# Patient Record
(demographics unavailable — no encounter records)

---

## 2024-12-27 NOTE — PHYSICAL EXAM
[General Appearance - Well Developed] : well developed [Normal Appearance] : normal appearance [Well Groomed] : well groomed [General Appearance - Well Nourished] : well nourished [No Deformities] : no deformities [General Appearance - In No Acute Distress] : no acute distress [Normal Conjunctiva] : the conjunctiva exhibited no abnormalities [Eyelids - No Xanthelasma] : the eyelids demonstrated no xanthelasmas [Normal Oral Mucosa] : normal oral mucosa [No Oral Pallor] : no oral pallor [No Oral Cyanosis] : no oral cyanosis [Normal Jugular Venous A Waves Present] : normal jugular venous A waves present [Normal Jugular Venous V Waves Present] : normal jugular venous V waves present [No Jugular Venous Myers A Waves] : no jugular venous myers A waves [Respiration, Rhythm And Depth] : normal respiratory rhythm and effort [Exaggerated Use Of Accessory Muscles For Inspiration] : no accessory muscle use [Auscultation Breath Sounds / Voice Sounds] : lungs were clear to auscultation bilaterally [Abdomen Soft] : soft [Abdomen Tenderness] : non-tender [Abdomen Mass (___ Cm)] : no abdominal mass palpated [Abnormal Walk] : normal gait [Gait - Sufficient For Exercise Testing] : the gait was sufficient for exercise testing [Nail Clubbing] : no clubbing of the fingernails [Cyanosis, Localized] : no localized cyanosis [Petechial Hemorrhages (___cm)] : no petechial hemorrhages [] : no rash [Skin Color & Pigmentation] : normal skin color and pigmentation [No Venous Stasis] : no venous stasis [Skin Lesions] : no skin lesions [No Skin Ulcers] : no skin ulcer [No Xanthoma] : no  xanthoma was observed [Oriented To Time, Place, And Person] : oriented to person, place, and time [Affect] : the affect was normal [Mood] : the mood was normal [No Anxiety] : not feeling anxious [Normal Rate] : normal [No Gallop] : no gallop heard [III] : a grade 3 [2+] : left 2+ [___+] : [unfilled]U+ pretibial pitting edema on the left [Well Developed] : well developed [Frail] : frail [Normal Venous Pressure] : normal venous pressure [Rhythm Regular] : regular [Normal S1, S2] : normal S1, S2 [Normal S1] : normal S1 [Normal S2] : normal S2 [No Murmur] : no murmurs heard [II] : a grade 2 [No Pitting Edema] : no pitting edema present [No Abnormalities] : the abdominal aorta was not enlarged and no bruit was heard [Clear Lung Fields] : clear lung fields [Soft] : abdomen soft [No Edema] : no edema [S3] : no S3 [S4] : no S4 [Right Femoral Bruit] : no bruit heard over the right femoral artery [Left Femoral Bruit] : no bruit heard over the left femoral artery [Right Carotid Bruit] : no bruit heard over the right carotid [Left Carotid Bruit] : no bruit heard over the left carotid [de-identified] : walker

## 2024-12-27 NOTE — REVIEW OF SYSTEMS
[Negative] : Heme/Lymph [Feeling Fatigued] : feeling fatigued [SOB] : no shortness of breath [Dyspnea on exertion] : dyspnea during exertion [Chest Discomfort] : no chest discomfort [Lower Ext Edema] : lower extremity edema [Leg Claudication] : no intermittent leg claudication [Palpitations] : no palpitations [PND] : PND [Orthopnea] : no orthopnea [Syncope] : no syncope [Blood in Stool] : no blood in stool [FreeTextEntry2] : see Hpi [FreeTextEntry5] : improved  [FreeTextEntry7] : denies bleeding

## 2024-12-27 NOTE — REVIEW OF SYSTEMS
[Negative] : Heme/Lymph [Feeling Fatigued] : feeling fatigued [SOB] : no shortness of breath [Dyspnea on exertion] : dyspnea during exertion [Chest Discomfort] : no chest discomfort [Lower Ext Edema] : lower extremity edema [Leg Claudication] : no intermittent leg claudication [Palpitations] : no palpitations [Orthopnea] : no orthopnea [PND] : PND [Syncope] : no syncope [Blood in Stool] : no blood in stool [FreeTextEntry2] : see Hpi [FreeTextEntry5] : improved  [FreeTextEntry7] : denies bleeding

## 2024-12-27 NOTE — HISTORY OF PRESENT ILLNESS
[FreeTextEntry1] : Julia presented to the office for a cardiovascular evaluation.  She was last seen in the office 3 months ago by Dr Farfan and arrives today for post hospitalization follow up.  She is now 83 years old, with a history of aortic stenosis.  She does not have a history of hypertension. She has had mild dyslipidemia with a high HDL. She has iron deficiency anemia and is on Venofer. She has chronic dyspnea based on obesity and a degree of COPD.  She presented to the hospital in October, 2019 with severe shortness of breath, with minimal activity. There was some initial concern about pulmonary embolism, but her CTA was negative. Ultimately, she was diagnosed with asthma and COPD, and was treated for a pulmonary process, and improved. There was some initial concern about the possibility of an ischemic process, but she improved without any ischemic issues being addressed.   Unfortunately, she developed unstable angina on Father's Day Weekend, 2022.  Cardiac catheterization revealed a 90% stenosis of the distal left main, a 90% stenosis of the ostial circumflex, and 90% stenosis of the ostium of the LAD.  A balloon pump was placed, as was a PA catheter, and she was transferred.  Her ejection fraction was noted to be 35%, with what was felt to be moderate aortic stenosis.  Her balloon pump was removed, complicated by a pseudoaneurysm.  She required thrombin injection.  Her course was further notable for paroxysmal atrial fibrillation, which converted to sinus rhythm, and for which she was treated with amiodarone and digoxin.  On July 1, 2022 she went for balloon aortic valvuloplasty.  She had rotational atherectomy and stenting of the left main, circumflex and LAD.  She was readmitted about 6 weeks later for elective transcatheter aortic valve replacement.  That procedure was done without complication.  Her post AVR ejection fraction was noted to be 65%.   She was admitted to the hospital in April 2023 with rectal bleeding.  Her Plavix was held.  No active cardiovascular issues were noted at that time.  I did not resume her clopidogrel, as I felt that the risk exceeded the benefit. She had colonoscopy with bandin.   She remained off clopidogrel, and on aspirin. She was scheduled for biopsy on 8/25/23 with Dr Martinez at [facility]. she was evaluated August 17, 2023 in anticipation of that procedure.  Her blood pressure was a bit low, and she was asked to hydrate.  She was otherwise considered optimized for her urgent procedure.  She was evaluated in the office on September 8, 2023 after having had a difficult course of hospitalizations.  She had sigmoidoscopy and biopsy, and the next day had rectal bleeding and was admitted for 2 units of blood.  She was discharged and then readmitted for another set of transfusions.  She needed to stay hospitalized for several days, and was ultimately discharged with a diagnosis of chronic inflammatory issues in her colon for which steroid enemas were recommended.  Unfortunately she has continued to have bleeding, though her blood counts have been stable.  It has not been exactly clear what her problems are, but it seems as if she has a smoldering level of inflammation contributing to ongoing mild rectal bleeding, but no meaningful hemorrhage has been ongoing.  She had been quite edematous, which seems to stem from fluid resuscitation in the hospital.  I asked her to resume furosemide and potassium, daily at that point, and to return to the office in 2 weeks.  When she was seen in the office in June, 2024 she reported chest discomfort.  I recommended pharmacologic nuclear stress testing.  Pharmacologic nuclear stress testing was performed August 2, 2024.  This revealed a medium size, mild defect in the anterior wall, suggestive of ischemia, with an ejection fraction of 90%.  By phone we discussed the abnormal result, and the possibility that perhaps additional investigation should be undertaken.  She reported that that discomfort had resolved, and we elected to be conservative.   Previously, she was taking furosemide on a daily basis for about a week, and had a significant improvement, with a weight loss of about 12 pounds.  She denies any recent issues with rectal bleeding, and she remains on Rinvoq, which seems to be helping.  Her hemoglobin has been reasonable, by report. She has been seeing Dr. Espinal, and has had her iron monitored regularly. She has not had an infusion recently.  She has intermittent worsening in edema, responsive to furosemide. She last took furosemide a few weeks ago.  She no longer experiences the shoulder discomfort for which we did the stress test several months ago.   She arrives today for post hospitalization follow up.  She was admitted into [facility] from 12/17-12/20 with Acute decompensated HF and possible COPD exacerbation. She reported couple of weeks of worsening dyspnea on exertion and increasing cough. No reported blood in her stool.  Took a Lasix pill a few days ago but did not change her symptoms.  Does endorse having leg swelling that is worse on the left. Hospital work up included LE doppler to assess LFT LE swelling > than the RT that was negative for DVT.  High sensitivity troponins were negative, Pro BNP was 1915, TSH of 4.27. She was diuresed with IV lasix 40mg BID and transitioned to oral.  B/P measurements were running on the lower side of normal, ramipril was discontinued.  she arrives today accompanied by her family. She is feeling better but still very fatigued which she had reported prior to her hospitalization.  Her DTR feels her fatigue is worse.  she sleeps alot during the day as per DTR. She is awake during the late night watching TV.   She sleeps in a recliner. Exertional effort is the same. Denies chest pains or dizziness.

## 2024-12-27 NOTE — DISCUSSION/SUMMARY
[FreeTextEntry1] : Her prior history as noted above includes an NSTEMI, transcatheter aortic valve replacement in 2022. She arrives today for post hospitalization follow up.  She was admitted with ADHF and COPD exacerbation. She arrives today in no acute distress. She appears compensated from a volume perspective despite some LE edema, which may be mostly dependent edema.   Her B/P is normal. ECG illustrates atrial fibrillation.  Her updated echocardiogram demonstrates from October 2024, notes LVEF 72%, grade II diastolic dysfunction. severe liyah annulus calcifications with mild-mod MR and mod TR. Normal functioning TAVR, PASP of 55. Pharmacologic nuclear stress testing was performed August 2, 2024.  This revealed a medium size, mild defect in the anterior wall, suggestive of ischemia, with an ejection fraction of 90%.  For now, she will continue lasix 40mg daily for volume management in the setting of HFpEF.  She will remain off ramipril for now.  Will try to slowly optimize GDMT as B/P tolerates.  She has been back in atrial fibrillation noted today as well as during her admission despite AAD therapy with amiodarone 100mg. With history of recurrent GI bleed( AVMs) and anemia, oral A/C therapy will likely make her bleeding risk out weight stroke risk.  At this time, would need to address the benefits of amiodarone therapy given she is going in and out of AFib while off A/C. She will continue metoprolol 100mg daily for rate control. In addition, in the setting of chronic fatigue, will follow up TFT.  Last TSH was mildly elevated in the hospital of 4.27.  Pharmacologic nuclear stress testing was performed August 2, 2024.  This revealed a medium size, mild defect in the anterior wall, suggestive of ischemia, with an ejection fraction of 90%.  There was previous discussion around following this up but she is free from anginal symptoms and was elected to proceed conservatively.  She will remain off Plavix, and on aspirin, given her LAD stent. She will continue atorvastatin 80mg for goal LDL <70.    I will arrange for home Blood draw.  She will follow up with Dr Farfan in 2 weeks. [EKG obtained to assist in diagnosis and management of assessed problem(s)] : EKG obtained to assist in diagnosis and management of assessed problem(s)

## 2024-12-27 NOTE — CARDIOLOGY SUMMARY
[de-identified] : sinus rhythm low voltage [de-identified] : 8/2024: pharm mild defect in ant wall that is reversible> ischemia [de-identified] : 10/2024: LVEF 72%, NML LV/RV, severely dilated LA/RA, Grade II diastolic dysfunction, severe calcified mitral valve annulus, post MV leaflet is restricted. Mod TR PASP 55  7/28/23 LVEF 60-65% mod-sever LVH grade II diastolic enlarged RV mild-mod MR TAVR-NML mod TR borderline PHTN [de-identified] : [facility] 2022 rotational arthrectomy and stenting 7/2022 [place], CX, LAD LOIS [de-identified] : balloon aortic valvular TAVR 2022 [de-identified] : carotid 2019 mild

## 2024-12-27 NOTE — PHYSICAL EXAM
[General Appearance - Well Developed] : well developed [Normal Appearance] : normal appearance [Well Groomed] : well groomed [General Appearance - Well Nourished] : well nourished [No Deformities] : no deformities [General Appearance - In No Acute Distress] : no acute distress [Normal Conjunctiva] : the conjunctiva exhibited no abnormalities [Eyelids - No Xanthelasma] : the eyelids demonstrated no xanthelasmas [Normal Oral Mucosa] : normal oral mucosa [No Oral Pallor] : no oral pallor [No Oral Cyanosis] : no oral cyanosis [Normal Jugular Venous A Waves Present] : normal jugular venous A waves present [Normal Jugular Venous V Waves Present] : normal jugular venous V waves present [No Jugular Venous Myers A Waves] : no jugular venous myers A waves [Respiration, Rhythm And Depth] : normal respiratory rhythm and effort [Exaggerated Use Of Accessory Muscles For Inspiration] : no accessory muscle use [Auscultation Breath Sounds / Voice Sounds] : lungs were clear to auscultation bilaterally [Abdomen Soft] : soft [Abdomen Tenderness] : non-tender [Abnormal Walk] : normal gait [Abdomen Mass (___ Cm)] : no abdominal mass palpated [Gait - Sufficient For Exercise Testing] : the gait was sufficient for exercise testing [Nail Clubbing] : no clubbing of the fingernails [Cyanosis, Localized] : no localized cyanosis [Petechial Hemorrhages (___cm)] : no petechial hemorrhages [Skin Color & Pigmentation] : normal skin color and pigmentation [] : no rash [No Venous Stasis] : no venous stasis [Skin Lesions] : no skin lesions [No Skin Ulcers] : no skin ulcer [No Xanthoma] : no  xanthoma was observed [Oriented To Time, Place, And Person] : oriented to person, place, and time [Affect] : the affect was normal [Mood] : the mood was normal [No Anxiety] : not feeling anxious [Normal Rate] : normal [No Gallop] : no gallop heard [III] : a grade 3 [2+] : left 2+ [___+] : [unfilled]U+ pretibial pitting edema on the left [Well Developed] : well developed [Frail] : frail [Normal Venous Pressure] : normal venous pressure [Rhythm Regular] : regular [Normal S1, S2] : normal S1, S2 [Normal S1] : normal S1 [Normal S2] : normal S2 [No Murmur] : no murmurs heard [II] : a grade 2 [No Pitting Edema] : no pitting edema present [No Abnormalities] : the abdominal aorta was not enlarged and no bruit was heard [Clear Lung Fields] : clear lung fields [Soft] : abdomen soft [No Edema] : no edema [S3] : no S3 [S4] : no S4 [Right Femoral Bruit] : no bruit heard over the right femoral artery [Left Femoral Bruit] : no bruit heard over the left femoral artery [Right Carotid Bruit] : no bruit heard over the right carotid [Left Carotid Bruit] : no bruit heard over the left carotid [de-identified] : walker

## 2024-12-27 NOTE — CARDIOLOGY SUMMARY
[de-identified] : sinus rhythm low voltage [de-identified] : 8/2024: pharm mild defect in ant wall that is reversible> ischemia [de-identified] : 10/2024: LVEF 72%, NML LV/RV, severely dilated LA/RA, Grade II diastolic dysfunction, severe calcified mitral valve annulus, post MV leaflet is restricted. Mod TR PASP 55  7/28/23 LVEF 60-65% mod-sever LVH grade II diastolic enlarged RV mild-mod MR TAVR-NML mod TR borderline PHTN [de-identified] : [facility] 2022 rotational arthrectomy and stenting 7/2022 [place], CX, LAD LOIS [de-identified] : balloon aortic valvular TAVR 2022 [de-identified] : carotid 2019 mild

## 2024-12-27 NOTE — REASON FOR VISIT
[Cardiac Failure] : cardiac failure [Arrhythmia/ECG Abnorrmalities] : arrhythmia/ECG abnormalities [Structural Heart and Valve Disease] : structural heart and valve disease [Coronary Artery Disease] : coronary artery disease [Other: ____] : [unfilled]

## 2024-12-27 NOTE — HISTORY OF PRESENT ILLNESS
[FreeTextEntry1] : Julia presented to the office for a cardiovascular evaluation.  She was last seen in the office 3 months ago by Dr Farfan and arrives today for post hospitalization follow up.  She is now 83 years old, with a history of aortic stenosis.  She does not have a history of hypertension. She has had mild dyslipidemia with a high HDL. She has iron deficiency anemia and is on Venofer. She has chronic dyspnea based on obesity and a degree of COPD.  She presented to the hospital in October, 2019 with severe shortness of breath, with minimal activity. There was some initial concern about pulmonary embolism, but her CTA was negative. Ultimately, she was diagnosed with asthma and COPD, and was treated for a pulmonary process, and improved. There was some initial concern about the possibility of an ischemic process, but she improved without any ischemic issues being addressed.   Unfortunately, she developed unstable angina on Father's Day Weekend, 2022.  Cardiac catheterization revealed a 90% stenosis of the distal left main, a 90% stenosis of the ostial circumflex, and 90% stenosis of the ostium of the LAD.  A balloon pump was placed, as was a PA catheter, and she was transferred.  Her ejection fraction was noted to be 35%, with what was felt to be moderate aortic stenosis.  Her balloon pump was removed, complicated by a pseudoaneurysm.  She required thrombin injection.  Her course was further notable for paroxysmal atrial fibrillation, which converted to sinus rhythm, and for which she was treated with amiodarone and digoxin.  On July 1, 2022 she went for balloon aortic valvuloplasty.  She had rotational atherectomy and stenting of the left main, circumflex and LAD.  She was readmitted about 6 weeks later for elective transcatheter aortic valve replacement.  That procedure was done without complication.  Her post AVR ejection fraction was noted to be 65%.   She was admitted to the hospital in April 2023 with rectal bleeding.  Her Plavix was held.  No active cardiovascular issues were noted at that time.  I did not resume her clopidogrel, as I felt that the risk exceeded the benefit. She had colonoscopy with bandin.   She remained off clopidogrel, and on aspirin. She was scheduled for biopsy on 8/25/23 with Dr Martinez at [facility]. she was evaluated August 17, 2023 in anticipation of that procedure.  Her blood pressure was a bit low, and she was asked to hydrate.  She was otherwise considered optimized for her urgent procedure.  She was evaluated in the office on September 8, 2023 after having had a difficult course of hospitalizations.  She had sigmoidoscopy and biopsy, and the next day had rectal bleeding and was admitted for 2 units of blood.  She was discharged and then readmitted for another set of transfusions.  She needed to stay hospitalized for several days, and was ultimately discharged with a diagnosis of chronic inflammatory issues in her colon for which steroid enemas were recommended.  Unfortunately she has continued to have bleeding, though her blood counts have been stable.  It has not been exactly clear what her problems are, but it seems as if she has a smoldering level of inflammation contributing to ongoing mild rectal bleeding, but no meaningful hemorrhage has been ongoing.  She had been quite edematous, which seems to stem from fluid resuscitation in the hospital.  I asked her to resume furosemide and potassium, daily at that point, and to return to the office in 2 weeks.  When she was seen in the office in June, 2024 she reported chest discomfort.  I recommended pharmacologic nuclear stress testing.  Pharmacologic nuclear stress testing was performed August 2, 2024.  This revealed a medium size, mild defect in the anterior wall, suggestive of ischemia, with an ejection fraction of 90%.  By phone we discussed the abnormal result, and the possibility that perhaps additional investigation should be undertaken.  She reported that that discomfort had resolved, and we elected to be conservative.   Previously, she was taking furosemide on a daily basis for about a week, and had a significant improvement, with a weight loss of about 12 pounds.  She denies any recent issues with rectal bleeding, and she remains on Rinvoq, which seems to be helping.  Her hemoglobin has been reasonable, by report. She has been seeing Dr. Espinal, and has had her iron monitored regularly. She has not had an infusion recently.  She has intermittent worsening in edema, responsive to furosemide. She last took furosemide a few weeks ago.  She no longer experiences the shoulder discomfort for which we did the stress test several months ago.   She arrives today for post hospitalization follow up.  She was admitted into [facility] from 12/17-12/20 with Acute decompensated HF and possible COPD exacerbation. She reported couple of weeks of worsening dyspnea on exertion and increasing cough. No reported blood in her stool.  Took a Lasix pill a few days ago but did not change her symptoms.  Does endorse having leg swelling that is worse on the left. Hospital work up included LE doppler to assess LFT LE swelling > than the RT that was negative for DVT.  High sensitivity troponins were negative, Pro BNP was 1915, TSH of 4.27. She was diuresed with IV lasix 40mg BID and transitioned to oral.  B/P measurements were running on the lower side of normal, ramipril was discontinued.  she arrives today accompanied by her family. She is feeling better but still very fatigued which she had reported prior to her hospitalization.  Her DTR feels her fatigue is worse.  she sleeps alot during the day as per DTR. She is awake during the late night watching TV.   She sleeps in a recliner. Exertional effort is the same. Denies chest pains or dizziness.

## 2025-01-10 NOTE — REVIEW OF SYSTEMS
[Feeling Fatigued] : feeling fatigued [Dyspnea on exertion] : dyspnea during exertion [Lower Ext Edema] : lower extremity edema [PND] : PND [Negative] : Heme/Lymph [SOB] : no shortness of breath [Chest Discomfort] : no chest discomfort [Leg Claudication] : no intermittent leg claudication [Palpitations] : no palpitations [Orthopnea] : no orthopnea [Syncope] : no syncope [Blood in Stool] : no blood in stool [FreeTextEntry2] : see Hpi [FreeTextEntry5] : improved  [FreeTextEntry7] : denies bleeding

## 2025-01-10 NOTE — DISCUSSION/SUMMARY
[FreeTextEntry1] : Her prior history as noted above includes an NSTEMI, transcatheter aortic valve replacement in 2022. She arrives today for post hospitalization follow up.  She was admitted with ADHF and COPD exacerbation. She arrives today in no acute distress. She appears reasonably compensated from a volume perspective despite some LE edema, which may be mostly dependent edema.   Her B/P is normal. ECG illustrates atrial fibrillation, and her ventricular response is accelerated..  Her updated echocardiogram from October 2024, notes LVEF 72%, grade II diastolic dysfunction. severe liyah annulus calcifications with mild-mod MR and mod TR. Normal functioning TAVR, PASP of 55. Pharmacologic nuclear stress testing was performed August 2, 2024.  This revealed a medium size, mild defect in the anterior wall, suggestive of ischemia, with an ejection fraction of 90%.    She has been back in atrial fibrillation noted today as well as during her admission despite AAD therapy with amiodarone 100mg. With history of recurrent GI bleed( AVMs) and anemia, oral A/C therapy will likely make her bleeding risk out weight stroke risk.     She is presently on metoprolol succinate 25 mg daily, and given her accelerated ventricular response I will increase it up to 25 mg 2 times daily.  Given her 3 pound weight gain on her scale, and a 6 pound weight gain on our scale, I have asked that she take furosemide 40 mg twice daily until her weight goes down 3 pounds, at which time she can resume 40 mg once daily.   Pharmacologic nuclear stress testing was performed August 2, 2024.  This revealed a medium size, mild defect in the anterior wall, suggestive of ischemia, with an ejection fraction of 90%.  There was previous discussion around following this up but she is free from anginal symptoms and was elected to proceed conservatively.  She will remain off Plavix, and on aspirin, given her LAD stent. She will continue atorvastatin 80mg for goal LDL <70.      She will see me in the office in about 2 weeks. [EKG obtained to assist in diagnosis and management of assessed problem(s)] : EKG obtained to assist in diagnosis and management of assessed problem(s)

## 2025-01-10 NOTE — HISTORY OF PRESENT ILLNESS
[FreeTextEntry1] : Julia presented to the office for a cardiovascular evaluation.  She was last seen in the office 2 weeks ago, after being hospitalized.  She is now 83 years old, with a history of aortic stenosis.  She does not have a history of hypertension. She has had mild dyslipidemia with a high HDL. She has iron deficiency anemia and is on Venofer. She has chronic dyspnea based on obesity and a degree of COPD.  She presented to the hospital in October, 2019 with severe shortness of breath, with minimal activity. There was some initial concern about pulmonary embolism, but her CTA was negative. Ultimately, she was diagnosed with asthma and COPD, and was treated for a pulmonary process, and improved. There was some initial concern about the possibility of an ischemic process, but she improved without any ischemic issues being addressed.   Unfortunately, she developed unstable angina on Father's Day Weekend, 2022.  Cardiac catheterization revealed a 90% stenosis of the distal left main, a 90% stenosis of the ostial circumflex, and 90% stenosis of the ostium of the LAD.  A balloon pump was placed, as was a PA catheter, and she was transferred.  Her ejection fraction was noted to be 35%, with what was felt to be moderate aortic stenosis.  Her balloon pump was removed, complicated by a pseudoaneurysm.  She required thrombin injection.  Her course was further notable for paroxysmal atrial fibrillation, which converted to sinus rhythm, and for which she was treated with amiodarone and digoxin.  On July 1, 2022 she went for balloon aortic valvuloplasty.  She had rotational atherectomy and stenting of the left main, circumflex and LAD.  She was readmitted about 6 weeks later for elective transcatheter aortic valve replacement.  That procedure was done without complication.  Her post AVR ejection fraction was noted to be 65%.   She was admitted to the hospital in April 2023 with rectal bleeding.  Her Plavix was held.  No active cardiovascular issues were noted at that time.  I did not resume her clopidogrel, as I felt that the risk exceeded the benefit. She had colonoscopy with bandin.   She remained off clopidogrel, and on aspirin. She was scheduled for biopsy on 8/25/23 with Dr Martinez at [facility]. she was evaluated August 17, 2023 in anticipation of that procedure.  Her blood pressure was a bit low, and she was asked to hydrate.  She was otherwise considered optimized for her urgent procedure.  She was evaluated in the office on September 8, 2023 after having had a difficult course of hospitalizations.  She had sigmoidoscopy and biopsy, and the next day had rectal bleeding and was admitted for 2 units of blood.  She was discharged and then readmitted for another set of transfusions.  She needed to stay hospitalized for several days, and was ultimately discharged with a diagnosis of chronic inflammatory issues in her colon for which steroid enemas were recommended.  Unfortunately she has continued to have bleeding, though her blood counts have been stable.  It has not been exactly clear what her problems are, but it seems as if she has a smoldering level of inflammation contributing to ongoing mild rectal bleeding, but no meaningful hemorrhage has been ongoing.  She had been quite edematous, which seems to stem from fluid resuscitation in the hospital.  I asked her to resume furosemide and potassium, daily at that point, and to return to the office in 2 weeks.  When she was seen in the office in June, 2024 she reported chest discomfort.  I recommended pharmacologic nuclear stress testing.  Pharmacologic nuclear stress testing was performed August 2, 2024.  This revealed a medium size, mild defect in the anterior wall, suggestive of ischemia, with an ejection fraction of 90%.  By phone we discussed the abnormal result, and the possibility that perhaps additional investigation should be undertaken.  She reported that that discomfort had resolved, and we elected to be conservative.   Previously, she was taking furosemide on a daily basis for about a week, and had a significant improvement, with a weight loss of about 12 pounds.  She denies any recent issues with rectal bleeding, and she remains on Rinvoq, which seems to be helping.  Her hemoglobin has been reasonable, by report. She has been seeing Dr. Espinal, and has had her iron monitored regularly. She has not had an infusion recently.  She has intermittent worsening in edema, responsive to furosemide. She last took furosemide a few weeks ago.  She no longer experiences the shoulder discomfort for which we did the stress test several months ago.  She was seen in the office December 27, 2024 after having been admitted between December 17 and December 20 with acute decompensated heart failure and possible COPD as well as a recurrence of atrial fibrillation.  She reported at that time several weeks of worsening dyspnea on exertion and cough.  She did not immediately improved with furosemide.  She had lower extremity edema.  In the hospital, she was felt to have heart failure.  Venous Doppler was negative for DVT.  She was diuresed.  She was relatively hypotensive and ramipril was discontinued.  When she was seen in the office she was feeling better, but remained somewhat fatigued.  She presents to the office today doing a bit better.  Her strength is improving.  She has not been edematous or short of breath.  However, her weight has been somewhat labile, and she gained 3 pounds between yesterday and this morning.

## 2025-01-10 NOTE — PHYSICAL EXAM
[General Appearance - Well Developed] : well developed [Normal Appearance] : normal appearance [Well Groomed] : well groomed [General Appearance - Well Nourished] : well nourished [No Deformities] : no deformities [General Appearance - In No Acute Distress] : no acute distress [Normal Conjunctiva] : the conjunctiva exhibited no abnormalities [Eyelids - No Xanthelasma] : the eyelids demonstrated no xanthelasmas [Normal Oral Mucosa] : normal oral mucosa [No Oral Pallor] : no oral pallor [No Oral Cyanosis] : no oral cyanosis [Normal Jugular Venous A Waves Present] : normal jugular venous A waves present [Normal Jugular Venous V Waves Present] : normal jugular venous V waves present [No Jugular Venous Myers A Waves] : no jugular venous myers A waves [Respiration, Rhythm And Depth] : normal respiratory rhythm and effort [Exaggerated Use Of Accessory Muscles For Inspiration] : no accessory muscle use [Auscultation Breath Sounds / Voice Sounds] : lungs were clear to auscultation bilaterally [Abdomen Soft] : soft [Abdomen Tenderness] : non-tender [Abdomen Mass (___ Cm)] : no abdominal mass palpated [Abnormal Walk] : normal gait [Gait - Sufficient For Exercise Testing] : the gait was sufficient for exercise testing [Nail Clubbing] : no clubbing of the fingernails [Cyanosis, Localized] : no localized cyanosis [Petechial Hemorrhages (___cm)] : no petechial hemorrhages [Skin Color & Pigmentation] : normal skin color and pigmentation [] : no rash [No Venous Stasis] : no venous stasis [Skin Lesions] : no skin lesions [No Skin Ulcers] : no skin ulcer [No Xanthoma] : no  xanthoma was observed [Oriented To Time, Place, And Person] : oriented to person, place, and time [Affect] : the affect was normal [Mood] : the mood was normal [No Anxiety] : not feeling anxious [Normal Rate] : normal [No Gallop] : no gallop heard [III] : a grade 3 [2+] : left 2+ [___+] : [unfilled]U+ pretibial pitting edema on the left [Well Developed] : well developed [Frail] : frail [Normal Venous Pressure] : normal venous pressure [Normal S1, S2] : normal S1, S2 [Rhythm Regular] : regular [Normal S1] : normal S1 [Normal S2] : normal S2 [No Murmur] : no murmurs heard [II] : a grade 2 [No Pitting Edema] : no pitting edema present [No Abnormalities] : the abdominal aorta was not enlarged and no bruit was heard [Clear Lung Fields] : clear lung fields [Soft] : abdomen soft [No Edema] : no edema [S3] : no S3 [S4] : no S4 [Right Femoral Bruit] : no bruit heard over the right femoral artery [Left Femoral Bruit] : no bruit heard over the left femoral artery [Right Carotid Bruit] : no bruit heard over the right carotid [Left Carotid Bruit] : no bruit heard over the left carotid [de-identified] : walker

## 2025-01-31 NOTE — REASON FOR VISIT
[Cardiac Failure] : cardiac failure [Arrhythmia/ECG Abnorrmalities] : arrhythmia/ECG abnormalities [Structural Heart and Valve Disease] : structural heart and valve disease [Coronary Artery Disease] : coronary artery disease [Hyperlipidemia] : hyperlipidemia

## 2025-02-02 NOTE — ADDENDUM
[FreeTextEntry1] : saw her 2-3 w ago looks ok today weight had gone up and we set a goal of 130 lbs, has been adjusting her meds appropriately no sob sr on ekg check labs

## 2025-02-02 NOTE — CARDIOLOGY SUMMARY
[de-identified] : Atrial fibrillation [de-identified] : 8/2024: pharm mild defect in ant wall that is reversible> ischemia [de-identified] : 10/2024: LVEF 72%, NML LV/RV, severely dilated LA/RA, Grade II diastolic dysfunction, severe calcified mitral valve annulus, post MV leaflet is restricted. Mod TR PASP 55  7/28/23 LVEF 60-65% mod-sever LVH grade II diastolic enlarged RV mild-mod MR TAVR-NML mod TR borderline PHTN [de-identified] : [facility] 2022 rotational arthrectomy and stenting 7/2022 [place], CX, LAD LOIS [de-identified] : balloon aortic valvular TAVR 2022 [de-identified] : carotid 2019 mild

## 2025-02-02 NOTE — REVIEW OF SYSTEMS
[Feeling Fatigued] : feeling fatigued [Dyspnea on exertion] : dyspnea during exertion [Lower Ext Edema] : lower extremity edema [Negative] : Heme/Lymph [SOB] : no shortness of breath [Chest Discomfort] : no chest discomfort [Leg Claudication] : no intermittent leg claudication [Palpitations] : no palpitations [Orthopnea] : no orthopnea [PND] : no PND [Syncope] : no syncope [Blood in Stool] : no blood in stool [FreeTextEntry2] : see Hpi [FreeTextEntry5] : improved - HPI [FreeTextEntry7] : denies bleeding

## 2025-02-02 NOTE — DISCUSSION/SUMMARY
[EKG obtained to assist in diagnosis and management of assessed problem(s)] : EKG obtained to assist in diagnosis and management of assessed problem(s) [FreeTextEntry1] : Her prior history as noted above includes an NSTEMI, transcatheter aortic valve replacement in 2022. In December,  She was admitted with ADHF and COPD exacerbation and has been slowly recovering and regaining strength. She arrives today in no acute distress. She is compensated from a volume perspective after a short course of increased diuresis.  Her B/P is normal. ECG illustrates sinus rhythm today Her updated echocardiogram from October 2024, notes LVEF 72%, grade II diastolic dysfunction. severe liyah annulus calcifications with mild-mod MR and mod TR. Normal functioning TAVR, PASP of 55.  She can continue once a day daily dosing of lasix 40mg and will continue to monitor her weight to maintain a weight of 130s.  She had Pharmacologic nuclear stress testing was performed August 2, 2024.  This revealed a medium size, mild defect in the anterior wall, suggestive of ischemia, with an ejection fraction of 90%, for this, There was previous discussion around following this up but she is free from anginal symptoms and was elected to proceed conservatively.  She will remain off Plavix, and on aspirin, given her LAD stent. She will continue atorvastatin 80mg for goal LDL <70.      She is back in sinus rhythm today, she will continue amiodarone 100mg. She will remain on metoprolol 100mg With history of recurrent GI bleed( AVMs) and anemia, oral A/C therapy will likely make her bleeding risk out weight stroke risk.     From a HFpEF perspective, she is maintaining euvolemia.  No other changes to her regimen at this time. She will go for B/W today.  Will likely repeat an echo within the next 6 months.  She will follow up with me in 6-8 weeks.

## 2025-02-02 NOTE — PHYSICAL EXAM
[General Appearance - Well Developed] : well developed [Normal Appearance] : normal appearance [Well Groomed] : well groomed [General Appearance - Well Nourished] : well nourished [No Deformities] : no deformities [General Appearance - In No Acute Distress] : no acute distress [Normal Conjunctiva] : the conjunctiva exhibited no abnormalities [Eyelids - No Xanthelasma] : the eyelids demonstrated no xanthelasmas [Normal Oral Mucosa] : normal oral mucosa [No Oral Pallor] : no oral pallor [No Oral Cyanosis] : no oral cyanosis [Normal Jugular Venous A Waves Present] : normal jugular venous A waves present [Normal Jugular Venous V Waves Present] : normal jugular venous V waves present [No Jugular Venous Myers A Waves] : no jugular venous myers A waves [Respiration, Rhythm And Depth] : normal respiratory rhythm and effort [Exaggerated Use Of Accessory Muscles For Inspiration] : no accessory muscle use [Auscultation Breath Sounds / Voice Sounds] : lungs were clear to auscultation bilaterally [Abdomen Soft] : soft [Abdomen Tenderness] : non-tender [Abdomen Mass (___ Cm)] : no abdominal mass palpated [Abnormal Walk] : normal gait [Gait - Sufficient For Exercise Testing] : the gait was sufficient for exercise testing [Nail Clubbing] : no clubbing of the fingernails [Cyanosis, Localized] : no localized cyanosis [Petechial Hemorrhages (___cm)] : no petechial hemorrhages [Skin Color & Pigmentation] : normal skin color and pigmentation [] : no rash [No Venous Stasis] : no venous stasis [Skin Lesions] : no skin lesions [No Skin Ulcers] : no skin ulcer [No Xanthoma] : no  xanthoma was observed [Oriented To Time, Place, And Person] : oriented to person, place, and time [Affect] : the affect was normal [Mood] : the mood was normal [No Anxiety] : not feeling anxious [Normal Rate] : normal [No Gallop] : no gallop heard [III] : a grade 3 [2+] : left 2+ [___+] : [unfilled]U+ pretibial pitting edema on the left [Well Developed] : well developed [Frail] : frail [Normal Venous Pressure] : normal venous pressure [Normal S1, S2] : normal S1, S2 [Rhythm Regular] : regular [Normal S1] : normal S1 [Normal S2] : normal S2 [No Murmur] : no murmurs heard [II] : a grade 2 [No Pitting Edema] : no pitting edema present [Clear Lung Fields] : clear lung fields [No Abnormalities] : the abdominal aorta was not enlarged and no bruit was heard [Soft] : abdomen soft [No Edema] : no edema [S3] : no S3 [S4] : no S4 [Right Femoral Bruit] : no bruit heard over the right femoral artery [Left Femoral Bruit] : no bruit heard over the left femoral artery [Right Carotid Bruit] : no bruit heard over the right carotid [de-identified] : walker [Left Carotid Bruit] : no bruit heard over the left carotid

## 2025-02-02 NOTE — PHYSICAL EXAM
[General Appearance - Well Developed] : well developed [Normal Appearance] : normal appearance [Well Groomed] : well groomed [General Appearance - Well Nourished] : well nourished [No Deformities] : no deformities [General Appearance - In No Acute Distress] : no acute distress [Normal Conjunctiva] : the conjunctiva exhibited no abnormalities [Eyelids - No Xanthelasma] : the eyelids demonstrated no xanthelasmas [Normal Oral Mucosa] : normal oral mucosa [No Oral Pallor] : no oral pallor [No Oral Cyanosis] : no oral cyanosis [Normal Jugular Venous A Waves Present] : normal jugular venous A waves present [Normal Jugular Venous V Waves Present] : normal jugular venous V waves present [No Jugular Venous Myers A Waves] : no jugular venous myers A waves [Respiration, Rhythm And Depth] : normal respiratory rhythm and effort [Exaggerated Use Of Accessory Muscles For Inspiration] : no accessory muscle use [Auscultation Breath Sounds / Voice Sounds] : lungs were clear to auscultation bilaterally [Abdomen Soft] : soft [Abdomen Tenderness] : non-tender [Abdomen Mass (___ Cm)] : no abdominal mass palpated [Abnormal Walk] : normal gait [Gait - Sufficient For Exercise Testing] : the gait was sufficient for exercise testing [Nail Clubbing] : no clubbing of the fingernails [Cyanosis, Localized] : no localized cyanosis [Petechial Hemorrhages (___cm)] : no petechial hemorrhages [Skin Color & Pigmentation] : normal skin color and pigmentation [] : no rash [No Venous Stasis] : no venous stasis [Skin Lesions] : no skin lesions [No Skin Ulcers] : no skin ulcer [No Xanthoma] : no  xanthoma was observed [Oriented To Time, Place, And Person] : oriented to person, place, and time [Affect] : the affect was normal [Mood] : the mood was normal [No Anxiety] : not feeling anxious [Normal Rate] : normal [No Gallop] : no gallop heard [III] : a grade 3 [2+] : left 2+ [___+] : [unfilled]U+ pretibial pitting edema on the left [Well Developed] : well developed [Frail] : frail [Normal Venous Pressure] : normal venous pressure [Normal S1, S2] : normal S1, S2 [Rhythm Regular] : regular [Normal S1] : normal S1 [Normal S2] : normal S2 [No Murmur] : no murmurs heard [II] : a grade 2 [No Pitting Edema] : no pitting edema present [No Abnormalities] : the abdominal aorta was not enlarged and no bruit was heard [Clear Lung Fields] : clear lung fields [Soft] : abdomen soft [No Edema] : no edema [S3] : no S3 [S4] : no S4 [Right Femoral Bruit] : no bruit heard over the right femoral artery [Left Femoral Bruit] : no bruit heard over the left femoral artery [Right Carotid Bruit] : no bruit heard over the right carotid [Left Carotid Bruit] : no bruit heard over the left carotid [de-identified] : walker

## 2025-02-02 NOTE — HISTORY OF PRESENT ILLNESS
[FreeTextEntry1] : Julia presented to the office for a cardiovascular evaluation.  She was last seen in the office 2 weeks ago, after being hospitalized.  She is now 83 years old, with a history of aortic stenosis.  She does not have a history of hypertension. She has had mild dyslipidemia with a high HDL. She has iron deficiency anemia and is on Venofer. She has chronic dyspnea based on obesity and a degree of COPD.  She presented to the hospital in October, 2019 with severe shortness of breath, with minimal activity. There was some initial concern about pulmonary embolism, but her CTA was negative. Ultimately, she was diagnosed with asthma and COPD, and was treated for a pulmonary process, and improved. There was some initial concern about the possibility of an ischemic process, but she improved without any ischemic issues being addressed.   Unfortunately, she developed unstable angina on Father's Day Weekend, 2022.  Cardiac catheterization revealed a 90% stenosis of the distal left main, a 90% stenosis of the ostial circumflex, and 90% stenosis of the ostium of the LAD.  A balloon pump was placed, as was a PA catheter, and she was transferred.  Her ejection fraction was noted to be 35%, with what was felt to be moderate aortic stenosis.  Her balloon pump was removed, complicated by a pseudoaneurysm.  She required thrombin injection.  Her course was further notable for paroxysmal atrial fibrillation, which converted to sinus rhythm, and for which she was treated with amiodarone and digoxin.  On July 1, 2022 she went for balloon aortic valvuloplasty.  She had rotational atherectomy and stenting of the left main, circumflex and LAD.  She was readmitted about 6 weeks later for elective transcatheter aortic valve replacement.  That procedure was done without complication.  Her post AVR ejection fraction was noted to be 65%.   She was admitted to the hospital in April 2023 with rectal bleeding.  Her Plavix was held.  No active cardiovascular issues were noted at that time.  I did not resume her clopidogrel, as I felt that the risk exceeded the benefit. She had colonoscopy with bandin.   She remained off clopidogrel, and on aspirin. She was scheduled for biopsy on 8/25/23 with Dr Martinez at [facility]. she was evaluated August 17, 2023 in anticipation of that procedure.  Her blood pressure was a bit low, and she was asked to hydrate.  She was otherwise considered optimized for her urgent procedure.  She was evaluated in the office on September 8, 2023 after having had a difficult course of hospitalizations.  She had sigmoidoscopy and biopsy, and the next day had rectal bleeding and was admitted for 2 units of blood.  She was discharged and then readmitted for another set of transfusions.  She needed to stay hospitalized for several days, and was ultimately discharged with a diagnosis of chronic inflammatory issues in her colon for which steroid enemas were recommended.  Unfortunately she has continued to have bleeding, though her blood counts have been stable.  It has not been exactly clear what her problems are, but it seems as if she has a smoldering level of inflammation contributing to ongoing mild rectal bleeding, but no meaningful hemorrhage has been ongoing.  She had been quite edematous, which seems to stem from fluid resuscitation in the hospital.  I asked her to resume furosemide and potassium, daily at that point, and to return to the office in 2 weeks.  When she was seen in the office in June, 2024 she reported chest discomfort.  I recommended pharmacologic nuclear stress testing.  Pharmacologic nuclear stress testing was performed August 2, 2024.  This revealed a medium size, mild defect in the anterior wall, suggestive of ischemia, with an ejection fraction of 90%.  By phone we discussed the abnormal result, and the possibility that perhaps additional investigation should be undertaken.  She reported that that discomfort had resolved, and we elected to be conservative.   Previously, she was taking furosemide on a daily basis for about a week, and had a significant improvement, with a weight loss of about 12 pounds.  She denies any recent issues with rectal bleeding, and she remains on Rinvoq, which seems to be helping.  Her hemoglobin has been reasonable, by report. She has been seeing Dr. Espinal, and has had her iron monitored regularly. She has not had an infusion recently.  She has intermittent worsening in edema, responsive to furosemide. She last took furosemide a few weeks ago.  She no longer experiences the shoulder discomfort for which we did the stress test several months ago.  She was seen in the office December 27, 2024 after having been admitted between December 17 and December 20 with acute decompensated heart failure and possible COPD as well as a recurrence of atrial fibrillation.  She reported at that time several weeks of worsening dyspnea on exertion and cough.  She did not immediately improved with furosemide.  She had lower extremity edema.  In the hospital, she was felt to have heart failure.  Venous Doppler was negative for DVT.  She was diuresed.  She was relatively hypotensive and ramipril was discontinued.  When she was seen in the office she was feeling better, but remained somewhat fatigued.  She was seen in the office about 3 weeks ago, She presents to the office today doing a bit better.  Her strength is improving.   She did call the office about one week ago with concerns for a rise in weight, and instructed to increase lasix to 40mg BID until her weight is back down to 130. She arrives today with weight down to 128 on her home scale, lasix is now down to 40mg daily.   She has not been edematous or short of breath.  Walking around the house without walker and using the walker outside.

## 2025-02-02 NOTE — HISTORY OF PRESENT ILLNESS
[FreeTextEntry1] : Julia presented to the office for a cardiovascular evaluation.  She was last seen in the office 2 weeks ago, after being hospitalized.  She is now 83 years old, with a history of aortic stenosis.  She does not have a history of hypertension. She has had mild dyslipidemia with a high HDL. She has iron deficiency anemia and is on Venofer. She has chronic dyspnea based on obesity and a degree of COPD.  She presented to the hospital in October, 2019 with severe shortness of breath, with minimal activity. There was some initial concern about pulmonary embolism, but her CTA was negative. Ultimately, she was diagnosed with asthma and COPD, and was treated for a pulmonary process, and improved. There was some initial concern about the possibility of an ischemic process, but she improved without any ischemic issues being addressed.   Unfortunately, she developed unstable angina on Father's Day Weekend, 2022.  Cardiac catheterization revealed a 90% stenosis of the distal left main, a 90% stenosis of the ostial circumflex, and 90% stenosis of the ostium of the LAD.  A balloon pump was placed, as was a PA catheter, and she was transferred.  Her ejection fraction was noted to be 35%, with what was felt to be moderate aortic stenosis.  Her balloon pump was removed, complicated by a pseudoaneurysm.  She required thrombin injection.  Her course was further notable for paroxysmal atrial fibrillation, which converted to sinus rhythm, and for which she was treated with amiodarone and digoxin.  On July 1, 2022 she went for balloon aortic valvuloplasty.  She had rotational atherectomy and stenting of the left main, circumflex and LAD.  She was readmitted about 6 weeks later for elective transcatheter aortic valve replacement.  That procedure was done without complication.  Her post AVR ejection fraction was noted to be 65%.   She was admitted to the hospital in April 2023 with rectal bleeding.  Her Plavix was held.  No active cardiovascular issues were noted at that time.  I did not resume her clopidogrel, as I felt that the risk exceeded the benefit. She had colonoscopy with bandin.   She remained off clopidogrel, and on aspirin. She was scheduled for biopsy on 8/25/23 with Dr Martinez at [facility]. she was evaluated August 17, 2023 in anticipation of that procedure.  Her blood pressure was a bit low, and she was asked to hydrate.  She was otherwise considered optimized for her urgent procedure.  She was evaluated in the office on September 8, 2023 after having had a difficult course of hospitalizations.  She had sigmoidoscopy and biopsy, and the next day had rectal bleeding and was admitted for 2 units of blood.  She was discharged and then readmitted for another set of transfusions.  She needed to stay hospitalized for several days, and was ultimately discharged with a diagnosis of chronic inflammatory issues in her colon for which steroid enemas were recommended.  Unfortunately she has continued to have bleeding, though her blood counts have been stable.  It has not been exactly clear what her problems are, but it seems as if she has a smoldering level of inflammation contributing to ongoing mild rectal bleeding, but no meaningful hemorrhage has been ongoing.  She had been quite edematous, which seems to stem from fluid resuscitation in the hospital.  I asked her to resume furosemide and potassium, daily at that point, and to return to the office in 2 weeks.  When she was seen in the office in June, 2024 she reported chest discomfort.  I recommended pharmacologic nuclear stress testing.  Pharmacologic nuclear stress testing was performed August 2, 2024.  This revealed a medium size, mild defect in the anterior wall, suggestive of ischemia, with an ejection fraction of 90%.  By phone we discussed the abnormal result, and the possibility that perhaps additional investigation should be undertaken.  She reported that that discomfort had resolved, and we elected to be conservative.   Previously, she was taking furosemide on a daily basis for about a week, and had a significant improvement, with a weight loss of about 12 pounds.  She denies any recent issues with rectal bleeding, and she remains on Rinvoq, which seems to be helping.  Her hemoglobin has been reasonable, by report. She has been seeing Dr. Espinal, and has had her iron monitored regularly. She has not had an infusion recently.  She has intermittent worsening in edema, responsive to furosemide. She last took furosemide a few weeks ago.  She no longer experiences the shoulder discomfort for which we did the stress test several months ago.  She was seen in the office December 27, 2024 after having been admitted between December 17 and December 20 with acute decompensated heart failure and possible COPD as well as a recurrence of atrial fibrillation.  She reported at that time several weeks of worsening dyspnea on exertion and cough.  She did not immediately improved with furosemide.  She had lower extremity edema.  In the hospital, she was felt to have heart failure.  Venous Doppler was negative for DVT.  She was diuresed.  She was relatively hypotensive and ramipril was discontinued.  When she was seen in the office she was feeling better, but remained somewhat fatigued.  She was seen in the office about 3 weeks ago, She presents to the office today doing a bit better.  Her strength is improving.   She did call the office about one week ago with concerns for a rise in weight, and instructed to increase lasix to 40mg BID until her weight is back down to 130. She arrives today with weight down to 128 on her home scale, lasix is now down to 40mg daily.   She has not been edematous or short of breath.  Walking around the house without walker and using the walker outside.

## 2025-02-02 NOTE — CARDIOLOGY SUMMARY
[de-identified] : Atrial fibrillation [de-identified] : 8/2024: pharm mild defect in ant wall that is reversible> ischemia [de-identified] : 10/2024: LVEF 72%, NML LV/RV, severely dilated LA/RA, Grade II diastolic dysfunction, severe calcified mitral valve annulus, post MV leaflet is restricted. Mod TR PASP 55  7/28/23 LVEF 60-65% mod-sever LVH grade II diastolic enlarged RV mild-mod MR TAVR-NML mod TR borderline PHTN [de-identified] : [facility] 2022 rotational arthrectomy and stenting 7/2022 [place], CX, LAD LOIS [de-identified] : balloon aortic valvular TAVR 2022 [de-identified] : carotid 2019 mild

## 2025-02-28 NOTE — HISTORY OF PRESENT ILLNESS
[FreeTextEntry1] : Julia presented to the office for a cardiovascular evaluation.  She was last seen in the office 1 month ago  She is now 83 years old, with a history of aortic stenosis.  She does not have a history of hypertension. She has had mild dyslipidemia with a high HDL. She has iron deficiency anemia and is on Venofer. She has chronic dyspnea based on obesity and a degree of COPD.  She presented to the hospital in October, 2019 with severe shortness of breath, with minimal activity. There was some initial concern about pulmonary embolism, but her CTA was negative. Ultimately, she was diagnosed with asthma and COPD, and was treated for a pulmonary process, and improved. There was some initial concern about the possibility of an ischemic process, but she improved without any ischemic issues being addressed.   Unfortunately, she developed unstable angina on Father's Day Weekend, 2022.  Cardiac catheterization revealed a 90% stenosis of the distal left main, a 90% stenosis of the ostial circumflex, and 90% stenosis of the ostium of the LAD.  A balloon pump was placed, as was a PA catheter, and she was transferred.  Her ejection fraction was noted to be 35%, with what was felt to be moderate aortic stenosis.  Her balloon pump was removed, complicated by a pseudoaneurysm.  She required thrombin injection.  Her course was further notable for paroxysmal atrial fibrillation, which converted to sinus rhythm, and for which she was treated with amiodarone and digoxin.  On July 1, 2022 she went for balloon aortic valvuloplasty.  She had rotational atherectomy and stenting of the left main, circumflex and LAD.  She was readmitted about 6 weeks later for elective transcatheter aortic valve replacement.  That procedure was done without complication.  Her post AVR ejection fraction was noted to be 65%.   She was admitted to the hospital in April 2023 with rectal bleeding.  Her Plavix was held.  No active cardiovascular issues were noted at that time.  I did not resume her clopidogrel, as I felt that the risk exceeded the benefit. She had colonoscopy with banding.   She remained off clopidogrel, and on aspirin. She was scheduled for biopsy on 8/25/23 with Dr Martinez at [facility]. she was evaluated August 17, 2023 in anticipation of that procedure.  Her blood pressure was a bit low, and she was asked to hydrate.  She was otherwise considered optimized for her urgent procedure.  She was evaluated in the office on September 8, 2023 after having had a difficult course of hospitalizations.  She had sigmoidoscopy and biopsy, and the next day had rectal bleeding and was admitted for 2 units of blood.  She was discharged and then readmitted for another set of transfusions.  She needed to stay hospitalized for several days, and was ultimately discharged with a diagnosis of chronic inflammatory issues in her colon for which steroid enemas were recommended.  Unfortunately she has continued to have bleeding, though her blood counts have been stable.  It has not been exactly clear what her problems are, but it seems as if she has a smoldering level of inflammation contributing to ongoing mild rectal bleeding, but no meaningful hemorrhage has been ongoing.  She had been quite edematous, which seems to stem from fluid resuscitation in the hospital.  I asked her to resume furosemide and potassium, daily at that point, and to return to the office in 2 weeks.  When she was seen in the office in June, 2024 she reported chest discomfort.  I recommended pharmacologic nuclear stress testing.  Pharmacologic nuclear stress testing was performed August 2, 2024.  This revealed a medium size, mild defect in the anterior wall, suggestive of ischemia, with an ejection fraction of 90%.  By phone we discussed the abnormal result, and the possibility that perhaps additional investigation should be undertaken.  She reported that that discomfort had resolved, and we elected to be conservative.     She was seen in the office December 27, 2024 after having been admitted between December 17 and December 20 with acute decompensated heart failure and possible COPD as well as a recurrence of atrial fibrillation.  She reported at that time several weeks of worsening dyspnea on exertion and cough.  She did not immediately improve with furosemide.  She had lower extremity edema.  In the hospital, she was felt to have heart failure.  Venous Doppler was negative for DVT.  She was diuresed.  She was relatively hypotensive and ramipril was discontinued.  When she was seen in the office she was feeling better, but remained somewhat fatigued.  She was seen in the office, and was initially doing better.  She did have a degree of volume overload, and was provided instructions to increase her Lasix to 40 mg twice daily in order to achieve a better weight of approximately 130 pounds.  She was back in sinus rhythm.  She presents to the office today having been doing reasonably well from a cardiovascular perspective.  She does not report chest discomfort or shortness of breath.  Her weight has been stable.   Her strength is improving.   Her  is going to be moved to a memory care facility over the next few weeks, if all goes as planned.

## 2025-02-28 NOTE — REASON FOR VISIT
[Cardiac Failure] : cardiac failure [Arrhythmia/ECG Abnorrmalities] : arrhythmia/ECG abnormalities [Structural Heart and Valve Disease] : structural heart and valve disease [Hyperlipidemia] : hyperlipidemia [Coronary Artery Disease] : coronary artery disease

## 2025-02-28 NOTE — DISCUSSION/SUMMARY
[FreeTextEntry1] : Her prior history as noted above includes an NSTEMI, transcatheter aortic valve replacement in 2022. In December,  She was admitted with ADHF and COPD exacerbation and has been slowly recovering and regaining strength. She arrives today in no acute distress. She is compensated from a volume perspective after a short course of increased diuresis.  Her B/P is initially elevated, but improved nicely by the conclusion of the examination. ECG illustrates sinus rhythm today Her updated echocardiogram from October 2024, notes LVEF 72%, grade II diastolic dysfunction. severe liyah annulus calcifications with mild-mod MR and mod TR. Normal functioning TAVR, PASP of 55.  She can continue once a day daily dosing of lasix 40mg and will continue to monitor her weight to maintain a weight of 130s.  She had Pharmacologic nuclear stress testing was performed August 2, 2024.  This revealed a medium size, mild defect in the anterior wall, suggestive of ischemia, with an ejection fraction of 90%, for this, There was previous discussion around following this up but she is free from anginal symptoms and was elected to proceed conservatively.  She will remain off Plavix, and on aspirin, given her LAD stent. She will continue atorvastatin 80mg for goal LDL <70.      She remains in sinus rhythm today, and she will continue amiodarone 100mg. She will remain on metoprolol 100mg daily.  Given her history of recurrent GI bleed( AVMs) and anemia, oral A/C therapy will likely make her bleeding risk outweigh the benefits.  I think for now, it is best to remain off anticoagulant therapy. From a HFpEF perspective, she is maintaining euvolemia.  No other changes to her regimen at this time.  She will plan to follow-up with me again in about 2 months.  [EKG obtained to assist in diagnosis and management of assessed problem(s)] : EKG obtained to assist in diagnosis and management of assessed problem(s)

## 2025-02-28 NOTE — CARDIOLOGY SUMMARY
[de-identified] : Sinus rhythm [de-identified] : 8/2024: pharm mild defect in ant wall that is reversible> ischemia [de-identified] : 10/2024: LVEF 72%, NML LV/RV, severely dilated LA/RA, Grade II diastolic dysfunction, severe calcified mitral valve annulus, post MV leaflet is restricted. Mod TR PASP 55  7/28/23 LVEF 60-65% mod-sever LVH grade II diastolic enlarged RV mild-mod MR TAVR-NML mod TR borderline PHTN [de-identified] : [facility] 2022 rotational arthrectomy and stenting 7/2022 [place], CX, LAD LOIS [de-identified] : balloon aortic valvular TAVR 2022 [de-identified] : carotid 2019 mild

## 2025-02-28 NOTE — PHYSICAL EXAM
[General Appearance - Well Developed] : well developed [Normal Appearance] : normal appearance [Well Groomed] : well groomed [General Appearance - Well Nourished] : well nourished [No Deformities] : no deformities [General Appearance - In No Acute Distress] : no acute distress [Normal Conjunctiva] : the conjunctiva exhibited no abnormalities [Eyelids - No Xanthelasma] : the eyelids demonstrated no xanthelasmas [Normal Oral Mucosa] : normal oral mucosa [No Oral Pallor] : no oral pallor [No Oral Cyanosis] : no oral cyanosis [Normal Jugular Venous A Waves Present] : normal jugular venous A waves present [Normal Jugular Venous V Waves Present] : normal jugular venous V waves present [No Jugular Venous Myers A Waves] : no jugular venous myers A waves [Respiration, Rhythm And Depth] : normal respiratory rhythm and effort [Exaggerated Use Of Accessory Muscles For Inspiration] : no accessory muscle use [Auscultation Breath Sounds / Voice Sounds] : lungs were clear to auscultation bilaterally [Abdomen Soft] : soft [Abdomen Tenderness] : non-tender [Abdomen Mass (___ Cm)] : no abdominal mass palpated [Abnormal Walk] : normal gait [Gait - Sufficient For Exercise Testing] : the gait was sufficient for exercise testing [Nail Clubbing] : no clubbing of the fingernails [Cyanosis, Localized] : no localized cyanosis [Petechial Hemorrhages (___cm)] : no petechial hemorrhages [Skin Color & Pigmentation] : normal skin color and pigmentation [] : no rash [No Venous Stasis] : no venous stasis [Skin Lesions] : no skin lesions [No Skin Ulcers] : no skin ulcer [No Xanthoma] : no  xanthoma was observed [Oriented To Time, Place, And Person] : oriented to person, place, and time [Affect] : the affect was normal [Mood] : the mood was normal [No Anxiety] : not feeling anxious [Normal Rate] : normal [No Gallop] : no gallop heard [III] : a grade 3 [2+] : left 2+ [___+] : [unfilled]U+ pretibial pitting edema on the left [Well Developed] : well developed [Frail] : frail [Normal Venous Pressure] : normal venous pressure [Normal S1, S2] : normal S1, S2 [Rhythm Regular] : regular [Normal S1] : normal S1 [Normal S2] : normal S2 [No Murmur] : no murmurs heard [II] : a grade 2 [No Pitting Edema] : no pitting edema present [No Abnormalities] : the abdominal aorta was not enlarged and no bruit was heard [Clear Lung Fields] : clear lung fields [Soft] : abdomen soft [No Edema] : no edema [S3] : no S3 [S4] : no S4 [Right Femoral Bruit] : no bruit heard over the right femoral artery [Left Femoral Bruit] : no bruit heard over the left femoral artery [Right Carotid Bruit] : no bruit heard over the right carotid [Left Carotid Bruit] : no bruit heard over the left carotid [de-identified] : walker

## 2025-04-07 NOTE — REVIEW OF SYSTEMS
[Chest Tightness] : chest tightness [A.M. Dry Mouth] : a.m. dry mouth [SOB on Exertion] : sob on exertion [Fever] : no fever [Chills] : no chills [Cough] : no cough [Hemoptysis] : no hemoptysis [Sputum] : no sputum [Dyspnea] : no dyspnea [Pleuritic Pain] : no pleuritic pain

## 2025-04-07 NOTE — PHYSICAL EXAM
[Normal Appearance] : normal appearance [No Neck Mass] : no neck mass [Normal Rate/Rhythm] : normal rate/rhythm [Normal S1, S2] : normal s1, s2 [No Murmurs] : no murmurs [No Resp Distress] : no resp distress [Wheeze] : wheeze [TextBox_68] : Shallow breathing, b/l upper airway expiratory wheezing

## 2025-04-07 NOTE — HISTORY OF PRESENT ILLNESS
[Former] : former [>= 20 pack years] : >= 20 pack years [Never] : never [Lung Cancer Screening] : Patient underwent lung cancer screening [Awakes Unrefreshed] : awakes unrefreshed [Awakes with Dry Mouth] : awakes with dry mouth [Awakes with Headache] : awakes with headache [Daytime Somnolence] : daytime somnolence [Fatigue] : fatigue [Nonrestorative Sleep] : nonrestorative sleep [Unintentional Sleep while Inactive] : unintentional sleep while inactive [TextBox_4] : DIXIE VAUGHN is a 83 year female seen in clinic for FOLLOW UP VISIT for COPD & Asthma with Emphysema. C/o severe SOBOE. Can only was 4 to 5 steps without becoming SOB.  [TextBox_11] : 1 [TextBox_13] : 40 [YearQuit] : 1992 [Difficulty Initiating Sleep] : does not have difficulty initiating sleep [Difficulty Maintaining Sleep] : does not have difficulty maintaining sleep [Snoring] : no snoring [Unintentional Sleep while Active] : no unintentional sleep while active [TextBox_77] : 11 AM [TextBox_79] : 5 AM [TextBox_93] : Only wakes for urination and when nasal cannula falle out [TextBox_57] : 40 pack year, quit 1993